# Patient Record
Sex: MALE | URBAN - METROPOLITAN AREA
[De-identification: names, ages, dates, MRNs, and addresses within clinical notes are randomized per-mention and may not be internally consistent; named-entity substitution may affect disease eponyms.]

---

## 2024-07-03 ENCOUNTER — OFFICE VISIT (OUTPATIENT)
Dept: URBAN - METROPOLITAN AREA CLINIC 12 | Facility: CLINIC | Age: 64
End: 2024-07-03

## 2024-08-14 ENCOUNTER — OFFICE VISIT (OUTPATIENT)
Dept: URBAN - METROPOLITAN AREA CLINIC 12 | Facility: CLINIC | Age: 64
End: 2024-08-14

## 2024-08-14 ENCOUNTER — DASHBOARD ENCOUNTERS (OUTPATIENT)
Age: 64
End: 2024-08-14

## 2024-08-14 ENCOUNTER — OFFICE VISIT (OUTPATIENT)
Dept: URBAN - METROPOLITAN AREA CLINIC 78 | Facility: CLINIC | Age: 64
End: 2024-08-14
Payer: COMMERCIAL

## 2024-08-14 VITALS
DIASTOLIC BLOOD PRESSURE: 72 MMHG | HEART RATE: 60 BPM | WEIGHT: 182.4 LBS | TEMPERATURE: 98.1 F | SYSTOLIC BLOOD PRESSURE: 109 MMHG | RESPIRATION RATE: 14 BRPM

## 2024-08-14 DIAGNOSIS — R07.0 THROAT PAIN: ICD-10-CM

## 2024-08-14 DIAGNOSIS — K30 INDIGESTION: ICD-10-CM

## 2024-08-14 DIAGNOSIS — Z12.11 SCREENING FOR COLON CANCER: ICD-10-CM

## 2024-08-14 PROBLEM — 162031009: Status: ACTIVE | Noted: 2024-08-14

## 2024-08-14 PROBLEM — 427679007: Status: ACTIVE | Noted: 2024-08-14

## 2024-08-14 PROCEDURE — 99204 OFFICE O/P NEW MOD 45 MIN: CPT | Performed by: INTERNAL MEDICINE

## 2024-08-14 RX ORDER — ESOMEPRAZOLE MAGNESIUM 40 MG/1
1 CAPSULE CAPSULE, DELAYED RELEASE PELLETS ORAL ONCE A DAY
Qty: 90 CAPSULE | Refills: 3 | Status: ACTIVE | COMMUNITY
Start: 2024-08-14

## 2024-08-14 RX ORDER — MONTELUKAST SODIUM 10 MG/1
TAKE 1 TABLET BY MOUTH EVERY DAY FOR 90 DAYS TABLET, COATED ORAL
Qty: 90 EACH | Refills: 0 | Status: ACTIVE | COMMUNITY

## 2024-08-14 RX ORDER — METOPROLOL TARTRATE 50 MG/1
TAKE 1 TABLET BY MOUTH AT BEDTIME THE NIGHT BEFORE TEST AND TAKE ONE TABLET ONE HOUR PRIOR TO EXAM TABLET ORAL
Qty: 2 EACH | Refills: 0 | Status: ACTIVE | COMMUNITY

## 2024-08-14 RX ORDER — FLUTICASONE PROPIONATE 50 UG/1
USE 2 SPRAYS IN EACH NOSTRIL ONCE DAILY SPRAY, METERED NASAL
Qty: 16 GRAM | Refills: 0 | Status: ACTIVE | COMMUNITY

## 2024-08-14 NOTE — HPI-TODAY'S VISIT:
Patient was referred by Dr. Nora Stroud  A copy of this document will be sent to the physician.   Patient is a 64-year-old pleasant male accompanied by his daughter.  He is presenting here for symptoms of exertional dyspnea and throat pain.  As per the daughter he has seen a cardiologist Dr. Cui and undergone as successful stress test the cause is not deemed to be cardiac.  He has also seen ENT  Dr Bloom and he is a scope done which was unrevealing for any throat cancer or finding patient does have polyps and not allergy related he has undergone allergy testing to.  He has longstanding history of asthma which is reportedly well-controlled mint contained.  He is currently on esomeprazole magnesium.   Reports throat pain  But denies dysphagia  Patient reports throat pain only by walking not with digestion  Denies change in BM   Never had colonoscopy

## 2024-11-25 ENCOUNTER — TELEPHONE ENCOUNTER (OUTPATIENT)
Dept: URBAN - METROPOLITAN AREA CLINIC 78 | Facility: CLINIC | Age: 64
End: 2024-11-25

## 2024-12-02 ENCOUNTER — TELEPHONE ENCOUNTER (OUTPATIENT)
Dept: URBAN - METROPOLITAN AREA CLINIC 78 | Facility: CLINIC | Age: 64
End: 2024-12-02

## 2024-12-02 RX ORDER — POLYETHYLENE GLYCOL-3350 AND ELECTROLYTES 236; 6.74; 5.86; 2.97; 22.74 G/274.31G; G/274.31G; G/274.31G; G/274.31G; G/274.31G
AS DIRECTED POWDER, FOR SOLUTION ORAL
OUTPATIENT
Start: 2024-12-03

## 2024-12-30 ENCOUNTER — TELEPHONE ENCOUNTER (OUTPATIENT)
Dept: URBAN - METROPOLITAN AREA CLINIC 78 | Facility: CLINIC | Age: 64
End: 2024-12-30

## 2024-12-31 ENCOUNTER — OFFICE VISIT (OUTPATIENT)
Dept: URBAN - METROPOLITAN AREA SURGERY CENTER 15 | Facility: SURGERY CENTER | Age: 64
End: 2024-12-31